# Patient Record
Sex: MALE | Race: WHITE | NOT HISPANIC OR LATINO | URBAN - METROPOLITAN AREA
[De-identification: names, ages, dates, MRNs, and addresses within clinical notes are randomized per-mention and may not be internally consistent; named-entity substitution may affect disease eponyms.]

---

## 2019-09-09 ENCOUNTER — OUTPATIENT (OUTPATIENT)
Dept: OUTPATIENT SERVICES | Facility: HOSPITAL | Age: 49
LOS: 1 days | End: 2019-09-09

## 2019-09-23 ENCOUNTER — OUTPATIENT (OUTPATIENT)
Dept: OUTPATIENT SERVICES | Facility: HOSPITAL | Age: 49
LOS: 1 days | End: 2019-09-23

## 2020-04-04 ENCOUNTER — EMERGENCY (EMERGENCY)
Facility: HOSPITAL | Age: 50
LOS: 1 days | End: 2020-04-04
Admitting: EMERGENCY MEDICINE
Payer: MEDICARE

## 2020-04-04 PROCEDURE — 99285 EMERGENCY DEPT VISIT HI MDM: CPT

## 2020-04-04 PROCEDURE — 71045 X-RAY EXAM CHEST 1 VIEW: CPT | Mod: 26

## 2020-10-16 ENCOUNTER — APPOINTMENT (OUTPATIENT)
Dept: ULTRASOUND IMAGING | Facility: CLINIC | Age: 50
End: 2020-10-16
Payer: MEDICARE

## 2020-10-16 PROCEDURE — 93971 EXTREMITY STUDY: CPT | Mod: LT

## 2021-02-22 PROBLEM — Z00.00 ENCOUNTER FOR PREVENTIVE HEALTH EXAMINATION: Status: ACTIVE | Noted: 2021-02-22

## 2023-03-27 ENCOUNTER — APPOINTMENT (OUTPATIENT)
Dept: ORTHOPEDIC SURGERY | Facility: CLINIC | Age: 53
End: 2023-03-27
Payer: MEDICARE

## 2023-03-27 VITALS — WEIGHT: 190 LBS | BODY MASS INDEX: 28.79 KG/M2 | HEIGHT: 68 IN

## 2023-03-27 DIAGNOSIS — I10 ESSENTIAL (PRIMARY) HYPERTENSION: ICD-10-CM

## 2023-03-27 PROCEDURE — 99203 OFFICE O/P NEW LOW 30 MIN: CPT

## 2023-03-27 NOTE — DISCUSSION/SUMMARY
[de-identified] : Discussed the nature of the diagnosis and risk and benefits of different modalities of treatment.\par Discussed the likelihood of symptoms resolving with time. \par Discussed a modified lifting technique.\par Start PT and continue use of tennis elbow strap.\par All questions answered. \par RTO 4 weeks.\par

## 2023-03-27 NOTE — HISTORY OF PRESENT ILLNESS
[Gradual] : gradual [7] : 7 [3] : 3 [Dull/Aching] : dull/aching [Radiating] : radiating [Sharp] : sharp [Shooting] : shooting [Stabbing] : stabbing [Throbbing] : throbbing [Tightness] : tightness [Squeezing] : squeezing [Intermittent] : intermittent [Rest] : rest [Retired] : Work status: retired [de-identified] : 53 year old male presenting with lateral RIGHT elbow pain for the past 1 year,  worse over the past 6 months. No injury/trauma, Pain worse with grabbing and lifting. He has tried and heat with minimal relief. Has also tried using elbow strap with some relief.  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

## 2023-03-27 NOTE — PHYSICAL EXAM
[Right] : right elbow [] : no erythema [FreeTextEntry3] : mild lateral elbow swelling  [FreeTextEntry9] : Full ROM

## 2023-04-24 ENCOUNTER — APPOINTMENT (OUTPATIENT)
Dept: ORTHOPEDIC SURGERY | Facility: CLINIC | Age: 53
End: 2023-04-24

## 2023-05-04 ENCOUNTER — APPOINTMENT (OUTPATIENT)
Dept: ORTHOPEDIC SURGERY | Facility: CLINIC | Age: 53
End: 2023-05-04

## 2023-06-29 ENCOUNTER — APPOINTMENT (OUTPATIENT)
Dept: ORTHOPEDIC SURGERY | Facility: CLINIC | Age: 53
End: 2023-06-29
Payer: MEDICARE

## 2023-06-29 VITALS — BODY MASS INDEX: 28.79 KG/M2 | WEIGHT: 190 LBS | HEIGHT: 68 IN

## 2023-06-29 DIAGNOSIS — M77.11 LATERAL EPICONDYLITIS, RIGHT ELBOW: ICD-10-CM

## 2023-06-29 PROCEDURE — 99213 OFFICE O/P EST LOW 20 MIN: CPT

## 2023-06-29 NOTE — HISTORY OF PRESENT ILLNESS
[6] : 6 [3] : 3 [Sharp] : sharp [Throbbing] : throbbing [de-identified] : 53 year old male being followed for RT lateral epicondylitis. He has been managing conservatively and attending PT. He has had some improvement but still with pain with certain activities. \par  [FreeTextEntry1] : right elbow  [de-identified] : PT

## 2023-06-29 NOTE — DISCUSSION/SUMMARY
[de-identified] : Discussed the nature of the diagnosis and risk and benefits of different modalities of treatment.\par He is able to tolerate his symptoms and is improving. \par He will continue with conservative management.\par Discussed that injection should be reserved for when symptoms are not controlled with conservative management. \par PRN.

## 2024-05-17 ENCOUNTER — APPOINTMENT (OUTPATIENT)
Dept: OPHTHALMOLOGY | Facility: CLINIC | Age: 54
End: 2024-05-17
Payer: MEDICARE

## 2024-05-17 ENCOUNTER — NON-APPOINTMENT (OUTPATIENT)
Age: 54
End: 2024-05-17

## 2024-05-17 PROCEDURE — 92004 COMPRE OPH EXAM NEW PT 1/>: CPT

## 2025-05-16 ENCOUNTER — APPOINTMENT (OUTPATIENT)
Dept: OPHTHALMOLOGY | Facility: CLINIC | Age: 55
End: 2025-05-16